# Patient Record
Sex: FEMALE | Race: WHITE | NOT HISPANIC OR LATINO | Employment: UNEMPLOYED | URBAN - METROPOLITAN AREA
[De-identification: names, ages, dates, MRNs, and addresses within clinical notes are randomized per-mention and may not be internally consistent; named-entity substitution may affect disease eponyms.]

---

## 2017-05-01 ENCOUNTER — TRANSCRIBE ORDERS (OUTPATIENT)
Dept: ADMINISTRATIVE | Facility: HOSPITAL | Age: 57
End: 2017-05-01

## 2017-05-01 DIAGNOSIS — Z12.31 VISIT FOR SCREENING MAMMOGRAM: Primary | ICD-10-CM

## 2017-05-25 ENCOUNTER — HOSPITAL ENCOUNTER (OUTPATIENT)
Dept: MAMMOGRAPHY | Facility: HOSPITAL | Age: 57
Discharge: HOME | End: 2017-05-25
Payer: COMMERCIAL

## 2017-05-25 DIAGNOSIS — Z12.31 VISIT FOR SCREENING MAMMOGRAM: ICD-10-CM

## 2017-05-25 PROCEDURE — G0202 SCR MAMMO BI INCL CAD: HCPCS

## 2018-03-08 ENCOUNTER — TRANSCRIBE ORDERS (OUTPATIENT)
Dept: ADMINISTRATIVE | Facility: HOSPITAL | Age: 58
End: 2018-03-08

## 2018-03-08 DIAGNOSIS — R29.2: Primary | ICD-10-CM

## 2018-03-12 ENCOUNTER — HOSPITAL ENCOUNTER (OUTPATIENT)
Dept: RADIOLOGY | Facility: HOSPITAL | Age: 58
Discharge: HOME | End: 2018-03-12
Payer: COMMERCIAL

## 2018-03-12 DIAGNOSIS — R29.2: ICD-10-CM

## 2018-03-12 PROCEDURE — 74240 X-RAY XM UPR GI TRC 1CNTRST: CPT

## 2018-03-19 ENCOUNTER — TRANSCRIBE ORDERS (OUTPATIENT)
Dept: ADMINISTRATIVE | Facility: HOSPITAL | Age: 58
End: 2018-03-19

## 2018-03-19 DIAGNOSIS — Z12.39 SCREENING BREAST EXAMINATION: Primary | ICD-10-CM

## 2018-04-20 ENCOUNTER — ANESTHESIA EVENT (OUTPATIENT)
Dept: PERIOP | Facility: AMBULARY SURGERY CENTER | Age: 58
End: 2018-04-20
Payer: COMMERCIAL

## 2018-04-23 RX ORDER — CHOLECALCIFEROL (VITAMIN D3) 125 MCG
2000 CAPSULE ORAL
COMMUNITY

## 2018-04-23 RX ORDER — UREA 10 %
800 LOTION (ML) TOPICAL
COMMUNITY

## 2018-04-23 RX ORDER — LEVOTHYROXINE SODIUM 0.12 MG/1
137 TABLET ORAL
COMMUNITY
End: 2019-08-22 | Stop reason: DRUGHIGH

## 2018-04-23 RX ORDER — DIMENHYDRINATE 50 MG
2 TABLET ORAL
COMMUNITY

## 2018-04-23 NOTE — PRE-PROCEDURE INSTRUCTIONS
Pre-Surgery Instructions:   Medication Instructions    Calcium-Magnesium-Vitamin D (CITRACAL SLOW RELEASE PO) Instructed patient per Anesthesia Guidelines   Cholecalciferol (VITAMIN D3) 2000 units TABS Instructed patient per Anesthesia Guidelines   Flaxseed, Linseed, (FLAX SEED OIL) 1000 MG CAPS Patient was instructed by Physician and understands   folic acid (FOLVITE) 402 MCG tablet Instructed patient per Anesthesia Guidelines   levothyroxine 125 mcg tablet Instructed patient per Anesthesia Guidelines  Pre procedural instructions reviewed-Instructed to follow Dr Man Lazaro instructions including his bowel prep

## 2018-05-01 ENCOUNTER — ANESTHESIA (OUTPATIENT)
Dept: PERIOP | Facility: AMBULARY SURGERY CENTER | Age: 58
End: 2018-05-01
Payer: COMMERCIAL

## 2018-05-01 ENCOUNTER — HOSPITAL ENCOUNTER (OUTPATIENT)
Facility: AMBULARY SURGERY CENTER | Age: 58
Setting detail: OUTPATIENT SURGERY
Discharge: HOME | End: 2018-05-01
Attending: SURGERY | Admitting: SURGERY
Payer: COMMERCIAL

## 2018-05-01 VITALS
WEIGHT: 167 LBS | HEART RATE: 55 BPM | OXYGEN SATURATION: 99 % | RESPIRATION RATE: 22 BRPM | DIASTOLIC BLOOD PRESSURE: 56 MMHG | SYSTOLIC BLOOD PRESSURE: 103 MMHG | BODY MASS INDEX: 27.16 KG/M2 | TEMPERATURE: 97.4 F

## 2018-05-01 DIAGNOSIS — K59.00 CONSTIPATION: ICD-10-CM

## 2018-05-01 PROCEDURE — 88305 TISSUE EXAM BY PATHOLOGIST: CPT | Performed by: PATHOLOGY

## 2018-05-01 RX ORDER — PROPOFOL 10 MG/ML
INJECTION, EMULSION INTRAVENOUS AS NEEDED
Status: DISCONTINUED | OUTPATIENT
Start: 2018-05-01 | End: 2018-05-01 | Stop reason: SURG

## 2018-05-01 RX ORDER — LIDOCAINE HYDROCHLORIDE 10 MG/ML
INJECTION, SOLUTION INFILTRATION; PERINEURAL AS NEEDED
Status: DISCONTINUED | OUTPATIENT
Start: 2018-05-01 | End: 2018-05-01 | Stop reason: SURG

## 2018-05-01 RX ORDER — SODIUM CHLORIDE 9 MG/ML
INJECTION, SOLUTION INTRAVENOUS CONTINUOUS PRN
Status: DISCONTINUED | OUTPATIENT
Start: 2018-05-01 | End: 2018-05-01 | Stop reason: SURG

## 2018-05-01 RX ADMIN — PROPOFOL 50 MG: 10 INJECTION, EMULSION INTRAVENOUS at 13:06

## 2018-05-01 RX ADMIN — PROPOFOL 50 MG: 10 INJECTION, EMULSION INTRAVENOUS at 13:14

## 2018-05-01 RX ADMIN — LIDOCAINE HYDROCHLORIDE 50 MG: 10 INJECTION, SOLUTION INFILTRATION; PERINEURAL at 12:58

## 2018-05-01 RX ADMIN — PROPOFOL 30 MG: 10 INJECTION, EMULSION INTRAVENOUS at 13:03

## 2018-05-01 RX ADMIN — SODIUM CHLORIDE: 0.9 INJECTION, SOLUTION INTRAVENOUS at 12:55

## 2018-05-01 RX ADMIN — PROPOFOL 50 MG: 10 INJECTION, EMULSION INTRAVENOUS at 13:09

## 2018-05-01 RX ADMIN — PROPOFOL 120 MG: 10 INJECTION, EMULSION INTRAVENOUS at 12:58

## 2018-05-01 NOTE — ANESTHESIA PREPROCEDURE EVALUATION
Review of Systems/Medical History  Patient summary reviewed  Chart reviewed  No history of anesthetic complications     Cardiovascular  Hyperlipidemia,    Pulmonary  Asthma , well controlled/ stable ,        GI/Hepatic    GERD , Bowel prep       Negative  ROS        Endo/Other  History of thyroid disease ,   Comment: Left hemithyroidectomy    GYN  Negative gynecology ROS          Hematology  Negative hematology ROS      Musculoskeletal  Negative musculoskeletal ROS        Neurology  Negative neurology ROS      Psychology   Negative psychology ROS              Physical Exam    Airway    Mallampati score: II  TM Distance: >3 FB  Neck ROM: full     Dental   No notable dental hx     Cardiovascular  Rhythm: regular, Rate: normal, Cardiovascular exam normal    Pulmonary  Pulmonary exam normal Breath sounds clear to auscultation,     Other Findings        Anesthesia Plan  ASA Score- 2     Anesthesia Type- IV sedation with anesthesia with ASA Monitors  Additional Monitors:   Airway Plan:         Plan Factors-    Induction- intravenous  Postoperative Plan-     Informed Consent- Anesthetic plan and risks discussed with patient  I personally reviewed this patient with the CRNA  Discussed and agreed on the Anesthesia Plan with the CRNA  Isabelle Young MD, have personally seen and evaluated the patient prior to anesthetic care  I have reviewed the pre-anesthetic record, and other medical records if appropriate to the anesthetic care  If a CRNA is involved in the case, I have reviewed the CRNA assessment, if present, and agree  Risks/benefits and alternatives discussed with patient including possible PONV, sore throat, and possibility of rare anesthetic and surgical emergencies

## 2018-05-01 NOTE — OP NOTE
OPERATIVE REPORT  PATIENT NAME: Aviva Farnsworth    :  1960  MRN: [id number]  Pt Location: [facility]  GI ROOM 01    SURGERY DATE: 2018    Surgeon(s) and Role:     * Yossi Johns DO - Primary    Preop Diagnosis:  Constipation [K59 00]    Post-Op Diagnosis Codes:     * Constipation [K59 00]  External hemorrhoids  Procedure(s) (LRB):  COLONOSCOPY (N/A) with random biopsies    Specimen(s):  ID Type Source Tests Collected by Time Destination   1 : cecal random bx Tissue Large Intestine, Cecum TISSUE EXAM Yossi Johns, DO 2018 1309    2 : hepatic flexure random bx Tissue Colon TISSUE EXAM Yossi Johns,  2018 1312    3 : descending colon random bx Tissue Colon TISSUE EXAM Yossi Johns,  2018 1313    4 : random bx rectum Tissue Rectum TISSUE EXAM Yossi Johns, DO 2018 1315        Estimated Blood Loss:   Minimal    Drains:       Anesthesia Type:   IV propofol provided by anesthesia    Operative Indications:  Constipation [K59 00]      Operative Findings:  Poor prep  Mucosa appeared normal  No polyps or masses noted  Random biopsies taken in cecum, hepatic flexure, descending colon, and rectum    Repeat colonoscopy 10 years  Likely benefit from a GI consultation    Complications:   None    Procedure and Technique:  Patient was brought into the GI suite properly identified  She was placed on her left side  Time-out was performed  IV sedation induced  Digital rectal exam revealed no particular masses  There was small amount external hemorrhoids  Colonoscope inserted in the rectum and advanced through the sigmoid colon and of the descending colon  Liquid stool was encountered  The joint was suctioned out  Continued around the splenic flexure across the transverse colon  Gave around the hepatic flexure down to the cecum  Again liquid stool was noted throughout the colon  Scope was gently withdrawn viewing all sidewalls on the way out    Random biopsies were taken with cold biopsy forceps noted above    Once into the rectum a retroflexed scope this appeared normal    I was present for the entire procedure    Patient Disposition:  PACU     SIGNATURE: Susan Mujica DO  DATE: May 1, 2018  TIME: 1:17 PM

## 2018-05-01 NOTE — H&P
History and Physical -General Surgical Care   Alexandr Ramachandran 56 y o  female MRN: [id number]  Unit/Bed#: OR [place] Encounter: [id number]       Principal Problem:  Change in bowel habits    HPI: Alexandr Ramachandran is a 56y o  year old female who presents with change in bowel habits  Specifically she did has issues with constipation  Last colonoscopy was approximately 8 years ago states took a long time for the bowel prep to work  Denies any weight loss  Review of Systems    Historical Information   Past Medical History:   Diagnosis Date    Anesthesia complication     shivering,difficulty urinating post surgery    Asthma     Bronchitis     Disease of thyroid gland     Ca     GERD (gastroesophageal reflux disease)     Heart murmur     murmer    Hyperlipidemia      Past Surgical History:   Procedure Laterality Date    CHOLECYSTECTOMY      DILATION AND EVACUATION      FOOT SURGERY Right     tumor    RIB BIOPSY Left     removal 2" off of 2 ribs    THYROID LOBECTOMY Left      Social History   History   Alcohol Use    Yes     Comment: rare socially     History   Drug Use No     History   Smoking Status    Never Smoker   Smokeless Tobacco    Never Used     History reviewed  No pertinent family history  Meds/Allergies     Prescriptions Prior to Admission   Medication    Calcium-Magnesium-Vitamin D (CITRACAL SLOW RELEASE PO)    Cholecalciferol (VITAMIN D3) 2000 units TABS    Flaxseed, Linseed, (FLAX SEED OIL) 3807 MG CAPS    folic acid (FOLVITE) 799 MCG tablet    levothyroxine 125 mcg tablet     No current facility-administered medications for this encounter  No Known Allergies        There were no vitals taken for this visit      No intake or output data in the 24 hours ending 05/01/18 1242    PHYSICAL EXAM  General appearance: alert and oriented, in no acute distress  Lungs: clear to auscultation bilaterally  Heart: regular rate and rhythm, S1, S2 normal, no murmur, click, rub or gallop  Abdomen: soft, non-tender; bowel sounds normal; no masses,  no organomegaly  Rectal: deferred  Skin: Skin color, texture, turgor normal  No rashes or lesions    Lab Results:   No visits with results within 1 Day(s) from this visit  Latest known visit with results is:   No results found for any previous visit  Imaging Studies:     ASSESSMENT:  Change in bowel habits/constipation    PLAN:  Risks and benefits for a colonoscopy cleaning perforation or bleeding were discussed with her and she agrees to proceed  Overall she may benefit from a GI opinion in the long run  Counseling / Coordination of Care  Total time spent today  20 minutes  Greater than 50% of total time was spent with the patient and / or family counseling and / or coordination of care

## 2018-05-31 ENCOUNTER — HOSPITAL ENCOUNTER (OUTPATIENT)
Dept: MAMMOGRAPHY | Facility: HOSPITAL | Age: 58
Discharge: HOME | End: 2018-05-31
Payer: COMMERCIAL

## 2018-05-31 DIAGNOSIS — Z12.39 SCREENING BREAST EXAMINATION: ICD-10-CM

## 2018-05-31 PROCEDURE — 77067 SCR MAMMO BI INCL CAD: CPT

## 2018-07-02 ENCOUNTER — TRANSCRIBE ORDERS (OUTPATIENT)
Dept: ADMINISTRATIVE | Facility: HOSPITAL | Age: 58
End: 2018-07-02

## 2018-07-02 DIAGNOSIS — N83.209 CYST OF OVARY, UNSPECIFIED LATERALITY: Primary | ICD-10-CM

## 2018-07-06 ENCOUNTER — HOSPITAL ENCOUNTER (OUTPATIENT)
Dept: ULTRASOUND IMAGING | Facility: HOSPITAL | Age: 58
Discharge: HOME | End: 2018-07-06
Payer: COMMERCIAL

## 2018-07-06 DIAGNOSIS — N83.209 CYST OF OVARY, UNSPECIFIED LATERALITY: ICD-10-CM

## 2018-07-06 PROCEDURE — 76830 TRANSVAGINAL US NON-OB: CPT

## 2018-07-06 PROCEDURE — 76856 US EXAM PELVIC COMPLETE: CPT

## 2018-08-08 ENCOUNTER — OFFICE VISIT (OUTPATIENT)
Dept: GYNECOLOGY | Facility: CLINIC | Age: 58
End: 2018-08-08
Payer: COMMERCIAL

## 2018-08-08 VITALS
TEMPERATURE: 97.2 F | WEIGHT: 170 LBS | OXYGEN SATURATION: 98 % | HEART RATE: 59 BPM | DIASTOLIC BLOOD PRESSURE: 82 MMHG | SYSTOLIC BLOOD PRESSURE: 120 MMHG | RESPIRATION RATE: 18 BRPM | HEIGHT: 66 IN | BODY MASS INDEX: 27.32 KG/M2

## 2018-08-08 DIAGNOSIS — Z12.31 ENCOUNTER FOR SCREENING MAMMOGRAM FOR MALIGNANT NEOPLASM OF BREAST: ICD-10-CM

## 2018-08-08 DIAGNOSIS — Z11.51 ENCOUNTER FOR SCREENING FOR HUMAN PAPILLOMAVIRUS (HPV): Primary | ICD-10-CM

## 2018-08-08 DIAGNOSIS — Z01.419 CERVICAL SMEAR, AS PART OF ROUTINE GYNECOLOGICAL EXAMINATION: ICD-10-CM

## 2018-08-08 DIAGNOSIS — N95.9 MENOPAUSAL DISORDER: ICD-10-CM

## 2018-08-08 PROCEDURE — G0145 SCR C/V CYTO,THINLAYER,RESCR: HCPCS | Performed by: OBSTETRICS & GYNECOLOGY

## 2018-08-08 PROCEDURE — 87624 HPV HI-RISK TYP POOLED RSLT: CPT | Performed by: OBSTETRICS & GYNECOLOGY

## 2018-08-08 PROCEDURE — 99396 PREV VISIT EST AGE 40-64: CPT | Performed by: OBSTETRICS & GYNECOLOGY

## 2018-08-08 NOTE — PROGRESS NOTES
Assessment/Plan:       Diagnoses and all orders for this visit:    Encounter for screening for human papillomavirus (HPV)  -     Liquid-based pap, screening    Cervical smear, as part of routine gynecological examination  -     Liquid-based pap, screening    Encounter for screening mammogram for malignant neoplasm of breast  -     Mammo screening bilateral w cad; Future    Menopausal disorder  -     DXA bone density spine hip and pelvis; Future          Subjective:      Patient ID: Rachael Angelucci is a 66 y o  female  The patient is a 53-year-old who presents for an annual exam   She denies any vaginal bleeding or breast problems  Her only bladder issue is that she has nocturia x1  She denies any dyspareunia  She had hepatitis testing as recommended for her age group and the results were negative  The following portions of the patient's history were reviewed and updated as appropriate: allergies, current medications, past family history, past medical history, past social history, past surgical history and problem list     Review of Systems   Constitutional: Negative  Respiratory: Negative for shortness of breath  Cardiovascular: Negative for chest pain  Gastrointestinal: Negative  Genitourinary: Negative  Skin: Negative  Psychiatric/Behavioral: Negative for agitation, behavioral problems and confusion  Objective:      /82 (BP Location: Right arm, Patient Position: Sitting, Cuff Size: Adult)   Pulse 59   Temp (!) 97 2 °F (36 2 °C)   Resp 18   Ht 5' 6" (1 676 m)   Wt 77 1 kg (170 lb)   SpO2 98%   Breastfeeding? No   BMI 27 44 kg/m²          Physical Exam   Constitutional: She appears well-developed and well-nourished  No distress  HENT:   Head: Normocephalic  Pulmonary/Chest: Effort normal  No respiratory distress  Abdominal: She exhibits no distension and no mass  There is no tenderness  There is no rebound and no guarding     Genitourinary: Rectum normal, vagina normal and uterus normal  There is breast tenderness  No breast swelling, discharge or bleeding  No labial fusion  There is no rash, tenderness, lesion or injury on the right labia  There is no rash, tenderness, lesion or injury on the left labia  Uterus is not deviated, not enlarged, not fixed and not tender  Cervix exhibits no motion tenderness, no discharge and no friability  Right adnexum displays no mass, no tenderness and no fullness  Left adnexum displays no mass, no tenderness and no fullness  No erythema, tenderness or bleeding in the vagina  No foreign body in the vagina  No signs of injury around the vagina  No vaginal discharge found  Genitourinary Comments: The patient is experiencing some breast tenderness at the periphery of the lower half of the breasts  She attributes this to wearing under wires  She is in the process of switching to non wired bras  Lymphadenopathy:        Right axillary: No pectoral and no lateral adenopathy present  Left axillary: No pectoral and no lateral adenopathy present  Skin: Skin is warm, dry and intact  She is not diaphoretic  No cyanosis  Nails show no clubbing  Psychiatric: She has a normal mood and affect   Her speech is normal and behavior is normal

## 2018-08-10 LAB — HPV RRNA GENITAL QL NAA+PROBE: NORMAL

## 2018-08-14 LAB
LAB AP GYN PRIMARY INTERPRETATION: NORMAL
Lab: NORMAL

## 2019-03-20 ENCOUNTER — HOSPITAL ENCOUNTER (EMERGENCY)
Facility: HOSPITAL | Age: 59
Discharge: HOME | End: 2019-03-21
Attending: EMERGENCY MEDICINE | Admitting: EMERGENCY MEDICINE
Payer: COMMERCIAL

## 2019-03-20 VITALS
OXYGEN SATURATION: 96 % | HEART RATE: 85 BPM | RESPIRATION RATE: 20 BRPM | SYSTOLIC BLOOD PRESSURE: 136 MMHG | TEMPERATURE: 98.9 F | DIASTOLIC BLOOD PRESSURE: 63 MMHG

## 2019-03-20 DIAGNOSIS — J06.9 URI (UPPER RESPIRATORY INFECTION): Primary | ICD-10-CM

## 2019-03-20 DIAGNOSIS — J18.9 PNEUMONIA: ICD-10-CM

## 2019-03-20 PROCEDURE — 99283 EMERGENCY DEPT VISIT LOW MDM: CPT

## 2019-03-21 ENCOUNTER — APPOINTMENT (EMERGENCY)
Dept: RADIOLOGY | Facility: HOSPITAL | Age: 59
End: 2019-03-21
Payer: COMMERCIAL

## 2019-03-21 PROCEDURE — 71046 X-RAY EXAM CHEST 2 VIEWS: CPT

## 2019-03-21 PROCEDURE — 94640 AIRWAY INHALATION TREATMENT: CPT

## 2019-03-21 RX ORDER — AZITHROMYCIN 250 MG/1
500 TABLET, FILM COATED ORAL ONCE
Status: DISCONTINUED | OUTPATIENT
Start: 2019-03-21 | End: 2019-03-21

## 2019-03-21 RX ORDER — ALBUTEROL SULFATE 90 UG/1
2 AEROSOL, METERED RESPIRATORY (INHALATION) ONCE
Status: COMPLETED | OUTPATIENT
Start: 2019-03-21 | End: 2019-03-21

## 2019-03-21 RX ORDER — AMOXICILLIN AND CLAVULANATE POTASSIUM 500; 125 MG/1; MG/1
TABLET, FILM COATED ORAL
Status: DISCONTINUED
Start: 2019-03-21 | End: 2019-03-21 | Stop reason: WASHOUT

## 2019-03-21 RX ORDER — PREDNISONE 20 MG/1
TABLET ORAL
Status: DISCONTINUED
Start: 2019-03-21 | End: 2019-03-21 | Stop reason: HOSPADM

## 2019-03-21 RX ORDER — PREDNISONE 10 MG/1
TABLET ORAL
Status: DISCONTINUED
Start: 2019-03-21 | End: 2019-03-21 | Stop reason: HOSPADM

## 2019-03-21 RX ORDER — ALBUTEROL SULFATE 2.5 MG/3ML
5 SOLUTION RESPIRATORY (INHALATION) ONCE
Status: COMPLETED | OUTPATIENT
Start: 2019-03-21 | End: 2019-03-21

## 2019-03-21 RX ORDER — ALBUTEROL SULFATE 90 UG/1
AEROSOL, METERED RESPIRATORY (INHALATION)
Status: COMPLETED
Start: 2019-03-21 | End: 2019-03-21

## 2019-03-21 RX ADMIN — PREDNISONE 50 MG: 20 TABLET ORAL at 02:45

## 2019-03-21 RX ADMIN — ALBUTEROL SULFATE 2 PUFF: 90 AEROSOL, METERED RESPIRATORY (INHALATION) at 02:45

## 2019-03-21 RX ADMIN — ALBUTEROL SULFATE 5 MG: 2.5 SOLUTION RESPIRATORY (INHALATION) at 00:17

## 2019-03-21 RX ADMIN — IPRATROPIUM BROMIDE 0.5 MG: 0.5 SOLUTION RESPIRATORY (INHALATION) at 00:17

## 2019-03-24 NOTE — ED PROVIDER NOTES
Pt Name: Frida Larson  MRN: [id number]  [place]: 1960  Age/Sex: 66 y o  female  Date of evaluation: 3/20/2019  PCP: Fabi Ogden MD    [address] [place] [address]    Chief Complaint   Patient presents with    Cough     Pt c/o cough since this weekend  Pt also c/o head congestion and a sinus headache  HPI    Corinne presents to the Emergency Department complaining of Cough  65 y/o F presents due to cough, congestion  Pt reports cough ongoing over the past 4 days, now worse despite OTC medications  Pt reports she feels as though she has congestion in her chest, unable to get it all out, is currently taking a cough suppressant  Cough         Past Medical and Surgical History    Past Medical History:   Diagnosis Date    Anesthesia complication     shivering,difficulty urinating post surgery    Anxiety     Arthritis of back     Asthma     Back injury 2017    Bronchitis     Decreased libido     Disease of thyroid gland     Ca     GERD (gastroesophageal reflux disease)     H/O degenerative disc disease     Heart murmur     murmer    Hyperlipidemia     RA (rheumatoid arthritis) ([address] )        Past Surgical History:   Procedure Laterality Date    CHOLECYSTECTOMY      DILATION AND EVACUATION      FOOT SURGERY Right     tumor    WA COLONOSCOPY FLX DX W/COLLJ SPEC WHEN PFRMD N/A 5/1/2018    Procedure: COLONOSCOPY;  Surgeon: Jen Head DO;  Location: [facility]  GI LAB;   Service: General    RIB BIOPSY Left     removal 2" off of 2 ribs    THYROID LOBECTOMY Left        Family History   Problem Relation Age of Onset   [employer] Colon cancer Mother     Osteoporosis Family     Melanoma Other     Lung cancer Other     Stroke Other        Social History     Tobacco Use    Smoking status: Never Smoker    Smokeless tobacco: Never Used   Substance Use Topics    Alcohol use: Yes     Comment: rare socially    Drug use: No              Allergies    Allergies   Allergen Reactions    No Active Allergies Home Medications:    Prior to Admission medications    Medication Sig Start Date End Date Taking? Authorizing Provider   Acetaminophen (TYLENOL EXTRA STRENGTH PO) Take by mouth   Yes Historical Provider, MD   Calcium-Magnesium-Vitamin D (CITRACAL SLOW RELEASE PO) Take 2 tablets by mouth daily after dinner Vitamin d included   Yes Historical Provider, MD   Cholecalciferol (VITAMIN D3) 2000 units TABS Take 2,000 Units by mouth daily after dinner   Yes Historical Provider, MD   Flaxseed, Linseed, (FLAX SEED OIL) 1000 MG CAPS Take 2 capsules by mouth daily after dinner   Yes Historical Provider, MD   folic acid (FOLVITE) 703 MCG tablet Take 800 mcg by mouth daily after dinner   Yes Historical Provider, MD   GuaiFENesin (MUCINEX PO) Take by mouth   Yes Historical Provider, MD   levothyroxine 125 mcg tablet Take 137 mcg by mouth daily in the early morning Every 3rd day patient takes  1 5 tablets    Yes Historical Provider, MD   Pseudoephedrine-DM-GG (ROBITUSSIN CF PO) Take by mouth   Yes Historical Provider, MD           Review of Systems    Review of Systems   Respiratory: Positive for cough  All other systems reviewed and negative      Physical Exam      ED Triage Vitals [03/20/19 2336]   Temperature Pulse Respirations Blood Pressure SpO2   98 9 °F (37 2 °C) 85 20 136/63 96 %      Temp Source Heart Rate Source Patient Position - Orthostatic VS BP Location FiO2 (%)   Oral Monitor Sitting Left arm --      Pain Score       --               Physical Exam        Diagnostic Results      Labs:    Results for orders placed or performed in visit on 08/08/18   HPV High Risk   Result Value Ref Range    HPV, High Risk HPV NEG, HPV16 NEG, HPV18 NEG HPV NEG, HPV16 NEG, HPV18 NEG   Liquid-based pap, screening   Result Value Ref Range    Case Report       Gynecologic Cytology Report                       Case: [id number]                                  Authorizing Provider:  Armando Donald MD          Collected: 08/08/2018 1000              Ordering Location:     [facility]         Received:            08/08/2018 1000                                     Services [facility]                                                                        First Screen:          Reyna Will                                                                  Specimen:    LIQUID-BASED PAP, SCREENING, Cervix, Endocervical                                          Primary Interpretation Negative for intraepithelial lesion or malignancy     Specimen Adequacy       Satisfactory for evaluation  Endocervical/transformation zone component present  High Risk HPV Result       HPV, High Risk: HPV NEG, HPV16 NEG, HPV18 NEG      Other High Risk HPV Negative, HPV 16 Negative, HPV 18 Negative  HPV types: 16,18,31,33,35,39,45,51,52,56,58,59,66 and 68 DNA are undetectable or below the pre-set threshold  [employer]s FDA approved Sherrie 4800 is utilized with strict adherence to the s instruction  manual to test for the presence of High-Risk HPV DNA, as well as HPV 16 and HPV 18  This instrument  has been validated by our laboratory and/or by the   A negative result does not preclude the presence of HPV infection because results depend on adequate  specimen collection, absence of inhibitors and sufficient DNA to be detected  Additionally, HPV negative  results are not intended to prevent women from proceeding to colposcopy if clinically warranted  Positive HPV test results indicate the presence of any one or more of the high risk types, but since patients  are often co-infected with low-risk types it does not rule out the presence of low-risk  types in patients  with mixed infections      Additional Information       [employer]'s FDA approved ,  and ThinPrep Imaging System are utilized with strict adherence to the 's instruction manual to prepare gynecologic and non-gynecologic cytology specimens for the production of ThinPrep slides as well as for gynecologic ThinPrep imaging  These processes have been validated by our laboratory and/or by the   The Pap test is not a diagnostic procedure and should not be used as the sole means to detect cervical cancer  It is only a screening procedure to aid in the detection of cervical cancer and its precursors  Both false-negative and false-positive results have been experienced  Your patient's test result should be interpreted in this context together with the history and clinical findings  All labs reviewed and utilized in the medical decision making process    Radiology:    XR chest 2 views   ED Interpretation   ? RML Infiltrate      Final Result      No acute cardiopulmonary disease  Workstation performed: [id number]             All radiology studies independently viewed by me and interpreted by the radiologist     Procedure    Procedures      Assessment and Plan    MDM    Initial ED assessment:  Sheila James is a 66 y o  female who presents with cough, congestion, fatigue  Vitals signs reviewed  Physical examination remarkable for wheeze throughout, RLLF with rhonchi  Initial Ddx  includes but is not limited to:    URI, bronchitis, pneumonia, GERD, aspiration pneumonitis, viral illness, smoke inhalation, CO poisoning, adverse medication reaction  Initial ED plan:   Plan will be to perform diagnostic testing of CXR and treat symptomatically with duoneb      Coding    ED Course of Care and Re-Assessments                          Medications   albuterol inhalation solution 5 mg (5 mg Nebulization Given 3/21/19 0017)   ipratropium (ATROVENT) 0 02 % inhalation solution 0 5 mg (0 5 mg Nebulization Given 3/21/19 0017)   albuterol (PROVENTIL HFA,VENTOLIN HFA) inhaler 2 puff (2 puffs Inhalation Given 3/21/19 0245)   predniSONE tablet 50 mg (50 mg Oral Given 3/21/19 2230)         FINAL IMPRESSION    Final diagnoses:   URI (upper respiratory infection)   Pneumonia         DISPOSITION/PLAN      <CRITCARE>      PATIENT REFERRED TO:    Keisha Byrd MD  [address] [facility]  [address]  [place] [address]  [phone]      For follow up    [place] [address] Emergency [facility] [address] [address] [phone]    If symptoms worsen      DISCHARGE MEDICATIONS:    Discharge Medication List as of 3/21/2019  2:54 AM      CONTINUE these medications which have NOT CHANGED    Details   Acetaminophen (TYLENOL EXTRA STRENGTH PO) Take by mouth, Historical Med      Calcium-Magnesium-Vitamin D (CITRACAL SLOW RELEASE PO) Take 2 tablets by mouth daily after dinner Vitamin d included, Historical Med      Cholecalciferol (VITAMIN D3) 2000 units TABS Take 2,000 Units by mouth daily after dinner, Historical Med      Flaxseed, Linseed, (FLAX SEED OIL) 1000 MG CAPS Take 2 capsules by mouth daily after dinner, Historical Med      folic acid (FOLVITE) 882 MCG tablet Take 800 mcg by mouth daily after dinner, Historical Med      GuaiFENesin (MUCINEX PO) Take by mouth, Historical Med      levothyroxine 125 mcg tablet Take 137 mcg by mouth daily in the early morning Every 3rd day patient takes  1 5 tablets , Historical Med      Pseudoephedrine-DM-GG (ROBITUSSIN CF PO) Take by mouth, Historical Med             No discharge procedures on file           NAZIA Farmer PA-C  03/24/19 2583

## 2019-04-06 ENCOUNTER — TRANSCRIBE ORDERS (OUTPATIENT)
Dept: ADMINISTRATIVE | Facility: HOSPITAL | Age: 59
End: 2019-04-06

## 2019-04-06 DIAGNOSIS — E04.1 THYROID NODULE: Primary | ICD-10-CM

## 2019-04-11 ENCOUNTER — HOSPITAL ENCOUNTER (OUTPATIENT)
Dept: ULTRASOUND IMAGING | Facility: HOSPITAL | Age: 59
Discharge: HOME | End: 2019-04-11
Payer: COMMERCIAL

## 2019-04-11 ENCOUNTER — HOSPITAL ENCOUNTER (OUTPATIENT)
Dept: RADIOLOGY | Facility: HOSPITAL | Age: 59
Discharge: HOME | End: 2019-04-11
Payer: COMMERCIAL

## 2019-04-11 DIAGNOSIS — E04.1 THYROID NODULE: ICD-10-CM

## 2019-04-11 PROCEDURE — 71046 X-RAY EXAM CHEST 2 VIEWS: CPT

## 2019-04-11 PROCEDURE — 76536 US EXAM OF HEAD AND NECK: CPT

## 2019-04-26 ENCOUNTER — HOSPITAL ENCOUNTER (OUTPATIENT)
Dept: RADIOLOGY | Facility: MEDICAL CENTER | Age: 59
Discharge: HOME | End: 2019-04-26
Payer: COMMERCIAL

## 2019-04-26 DIAGNOSIS — N95.9 MENOPAUSAL DISORDER: ICD-10-CM

## 2019-04-26 PROCEDURE — 77080 DXA BONE DENSITY AXIAL: CPT

## 2019-04-29 ENCOUNTER — TELEPHONE (OUTPATIENT)
Dept: OBGYN CLINIC | Facility: CLINIC | Age: 59
End: 2019-04-29

## 2019-04-29 ENCOUNTER — TELEPHONE (OUTPATIENT)
Dept: GYNECOLOGY | Facility: CLINIC | Age: 59
End: 2019-04-29

## 2019-04-29 DIAGNOSIS — E55.9 VITAMIN D DEFICIENCY: Primary | ICD-10-CM

## 2019-06-11 ENCOUNTER — HOSPITAL ENCOUNTER (OUTPATIENT)
Dept: MAMMOGRAPHY | Facility: HOSPITAL | Age: 59
Discharge: HOME | End: 2019-06-11
Payer: COMMERCIAL

## 2019-06-11 VITALS — WEIGHT: 167 LBS | BODY MASS INDEX: 27.82 KG/M2 | HEIGHT: 65 IN

## 2019-06-11 DIAGNOSIS — Z12.31 ENCOUNTER FOR SCREENING MAMMOGRAM FOR MALIGNANT NEOPLASM OF BREAST: ICD-10-CM

## 2019-06-11 PROCEDURE — 77067 SCR MAMMO BI INCL CAD: CPT

## 2019-06-13 ENCOUNTER — TELEPHONE (OUTPATIENT)
Dept: MAMMOGRAPHY | Facility: CLINIC | Age: 59
End: 2019-06-13

## 2019-06-13 ENCOUNTER — TRANSCRIBE ORDERS (OUTPATIENT)
Dept: ADMINISTRATIVE | Facility: HOSPITAL | Age: 59
End: 2019-06-13

## 2019-06-13 ENCOUNTER — TELEPHONE (OUTPATIENT)
Dept: GYNECOLOGY | Facility: CLINIC | Age: 59
End: 2019-06-13

## 2019-06-17 ENCOUNTER — TELEPHONE (OUTPATIENT)
Dept: GYNECOLOGY | Facility: CLINIC | Age: 59
End: 2019-06-17

## 2019-06-17 DIAGNOSIS — R92.2 INCONCLUSIVE MAMMOGRAM: Primary | ICD-10-CM

## 2019-06-18 DIAGNOSIS — R92.2 INCONCLUSIVE MAMMOGRAM: ICD-10-CM

## 2019-08-22 ENCOUNTER — OFFICE VISIT (OUTPATIENT)
Dept: GYNECOLOGY | Facility: CLINIC | Age: 59
End: 2019-08-22
Payer: COMMERCIAL

## 2019-08-22 VITALS
RESPIRATION RATE: 17 BRPM | HEIGHT: 65 IN | BODY MASS INDEX: 28.89 KG/M2 | WEIGHT: 173.4 LBS | SYSTOLIC BLOOD PRESSURE: 120 MMHG | HEART RATE: 63 BPM | DIASTOLIC BLOOD PRESSURE: 80 MMHG

## 2019-08-22 DIAGNOSIS — R92.2 INCONCLUSIVE MAMMOGRAM: ICD-10-CM

## 2019-08-22 DIAGNOSIS — Z11.51 SCREENING FOR HUMAN PAPILLOMAVIRUS (HPV): ICD-10-CM

## 2019-08-22 DIAGNOSIS — Z01.419 ENCOUNTER FOR GYNECOLOGICAL EXAMINATION (GENERAL) (ROUTINE) WITHOUT ABNORMAL FINDINGS: Primary | ICD-10-CM

## 2019-08-22 DIAGNOSIS — Z12.39 BREAST CANCER SCREENING: ICD-10-CM

## 2019-08-22 PROCEDURE — 99396 PREV VISIT EST AGE 40-64: CPT | Performed by: OBSTETRICS & GYNECOLOGY

## 2019-08-22 PROCEDURE — G0145 SCR C/V CYTO,THINLAYER,RESCR: HCPCS | Performed by: OBSTETRICS & GYNECOLOGY

## 2019-08-22 PROCEDURE — 87624 HPV HI-RISK TYP POOLED RSLT: CPT | Performed by: OBSTETRICS & GYNECOLOGY

## 2019-08-22 RX ORDER — ALBUTEROL SULFATE 90 UG/1
2 AEROSOL, METERED RESPIRATORY (INHALATION) EVERY 6 HOURS PRN
COMMUNITY

## 2019-08-22 RX ORDER — OMEGA-3-ACID ETHYL ESTERS 1 G/1
1400 CAPSULE, LIQUID FILLED ORAL DAILY
COMMUNITY

## 2019-08-22 NOTE — PROGRESS NOTES
Assessment/Plan:       Diagnoses and all orders for this visit:    Encounter for gynecological examination (general) (routine) without abnormal findings    Breast cancer screening  -     Mammo screening bilateral w 3d & cad; Future    Inconclusive mammogram  -     Mammo diagnostic right w 3d & cad; Future    Other orders  -     omega-3-acid ethyl esters (LOVAZA) 1 g capsule; Take 1,400 g by mouth daily  -     albuterol (PROVENTIL HFA,VENTOLIN HFA) 90 mcg/act inhaler; Inhale 2 puffs every 6 (six) hours as needed for wheezing          Subjective:      Patient ID: Rachael Angelucci is a 64 y o  female  Patient is a 61-year-old who presents for an annual gyn exam   She denies any vaginal bleeding, new breast problems, or bladder issues  She does have some vaginal dryness but intercourse is infrequent  Her  is very stressed because of work issues  They are planning a short vacation and hopefully things will be better at that time  She is not interested in using any vaginal estrogen  The patient's last mammography on June 11th of this year showed some asymmetry and she had a diagnostic right mammogram less than a week later  This did not show any abnormalities and the patient is scheduled for a follow-up diagnostic mammogram on the right in 6 months  Her Pap smear and HPV were negative last year  She would like to have these repeated this year  Her main complaint today is that she is unable to lose weight, despite a significant amount of exercise  She does not feel that she is eating excessively and eats a healthy diet  Her thyroid medication is stable and she is on an adequate amount of replacement  She is currently working on weight loss        The following portions of the patient's history were reviewed and updated as appropriate: allergies, current medications, past family history, past medical history, past social history, past surgical history and problem list     Review of Systems Constitutional: Negative  Respiratory: Negative for shortness of breath  Cardiovascular: Negative for chest pain  Gastrointestinal: Negative  Genitourinary: Negative  Skin: Negative  Psychiatric/Behavioral: Negative for agitation, behavioral problems and confusion  The patient is nervous/anxious  Objective:      /80 (BP Location: Left arm, Patient Position: Sitting, Cuff Size: Standard)   Pulse 63   Resp 17   Ht 5' 4 75" (1 645 m)   Wt 78 7 kg (173 lb 6 4 oz)   BMI 29 08 kg/m²          Physical Exam   Constitutional: She appears well-developed and well-nourished  No distress  HENT:   Head: Normocephalic  Pulmonary/Chest: Effort normal  No respiratory distress  Right breast exhibits no inverted nipple, no mass, no nipple discharge, no skin change and no tenderness  Left breast exhibits no inverted nipple, no mass, no nipple discharge, no skin change and no tenderness  Breasts are symmetrical    Abdominal: She exhibits no distension and no mass  There is no tenderness  There is no rebound and no guarding  Genitourinary: Rectum normal and uterus normal  No labial fusion  There is no rash, tenderness, lesion or injury on the right labia  There is no rash, tenderness, lesion or injury on the left labia  Uterus is not tender  Cervix exhibits no motion tenderness, no discharge and no friability  Right adnexum displays no mass, no tenderness and no fullness  Left adnexum displays no mass, no tenderness and no fullness  No erythema, tenderness or bleeding in the vagina  No foreign body in the vagina  No signs of injury around the vagina  No vaginal discharge found  Genitourinary Comments: The urethral meatus is normal      Lymphadenopathy:        Right axillary: No pectoral and no lateral adenopathy present  Left axillary: No pectoral and no lateral adenopathy present  Skin: Skin is warm, dry and intact  She is not diaphoretic  No cyanosis  Nails show no clubbing  Psychiatric: She has a normal mood and affect   Her speech is normal and behavior is normal

## 2019-08-23 LAB
HPV HR 12 DNA CVX QL NAA+PROBE: NEGATIVE
HPV16 DNA CVX QL NAA+PROBE: NEGATIVE
HPV18 DNA CVX QL NAA+PROBE: NEGATIVE

## 2019-08-26 LAB
LAB AP GYN PRIMARY INTERPRETATION: NORMAL
Lab: NORMAL

## 2019-08-27 ENCOUNTER — TELEPHONE (OUTPATIENT)
Dept: GYNECOLOGY | Facility: CLINIC | Age: 59
End: 2019-08-27

## 2019-08-27 NOTE — TELEPHONE ENCOUNTER
----- Message from Meg Merlin, MD sent at 8/26/2019 11:50 AM EDT -----  Notify pt that her pap and HPV are normal

## 2021-03-10 DIAGNOSIS — Z23 ENCOUNTER FOR IMMUNIZATION: ICD-10-CM
